# Patient Record
Sex: FEMALE
[De-identification: names, ages, dates, MRNs, and addresses within clinical notes are randomized per-mention and may not be internally consistent; named-entity substitution may affect disease eponyms.]

---

## 2019-01-11 ENCOUNTER — HOSPITAL ENCOUNTER (EMERGENCY)
Dept: HOSPITAL 31 - C.ER | Age: 59
Discharge: HOME | End: 2019-01-11
Payer: MEDICAID

## 2019-01-11 VITALS
DIASTOLIC BLOOD PRESSURE: 83 MMHG | HEART RATE: 91 BPM | TEMPERATURE: 98.2 F | OXYGEN SATURATION: 97 % | SYSTOLIC BLOOD PRESSURE: 148 MMHG | RESPIRATION RATE: 20 BRPM

## 2019-01-11 VITALS — BODY MASS INDEX: 30.9 KG/M2

## 2019-01-11 DIAGNOSIS — M25.561: Primary | ICD-10-CM

## 2019-01-11 NOTE — C.PDOC
History Of Present Illness


58 year old female presents to the ED for evaluation of right knee pain which 

began around 3 days ago, after working out at the gym. Patient reports she has 

been limping and has not taken any medicine for her pain. She denies direct 

trauma/injury to the area, falls, fever, chills, leg weakness or paresthesias. 


Time Seen by Provider: 01/11/19 17:16


Chief Complaint (Nursing): Lower Extremity Problem/Injury


History Per: Patient


History/Exam Limitations: no limitations


Onset/Duration Of Symptoms: Days (3)


Current Symptoms Are (Timing): Still Present


Additional History Per: Patient





- Knee


Description Of Injury: denies: Fell, Struck With Object, Struck Against Object, 

Twisted





Past Medical History


Reviewed: Historical Data, Nursing Documentation, Vital Signs


Vital Signs: 





                                Last Vital Signs











Temp  98.2 F   01/11/19 17:03


 


Pulse  91 H  01/11/19 17:03


 


Resp  20   01/11/19 17:03


 


BP  148/83   01/11/19 17:03


 


Pulse Ox  97   01/11/19 17:03














- Medical History


PMH: No Chronic Diseases


Surgical History: No Surg Hx


Family History: States: Unknown Family Hx





- Social History


Hx Tobacco Use: No


Hx Alcohol Use: No


Hx Substance Use: No





- Immunization History


Hx Tetanus Toxoid Vaccination: Yes


Hx Influenza Vaccination: Yes


Hx Pneumococcal Vaccination: Yes





Review Of Systems


Constitutional: Negative for: Fever, Chills


Musculoskeletal: Positive for: Other (right knee pain )


Neurological: Negative for: Weakness, Other (paresthesia)





Physical Exam





- Physical Exam


Appears: Non-toxic, No Acute Distress


Skin: Normal Color, Warm, Dry, No Ecchymosis, No Other (erythema to right knee )


Head: Atraumatic, Normacephalic


Eye(s): bilateral: Normal Inspection


Extremity: Normal ROM, Tenderness (point, to inferior aspect of the lateral 

right knee ), Capillary Refill (less than 2 seconds ), No Swelling, No Other 

(joint laxity)


Pulses: Left Dorsalis Pedis: Normal, Right Dorsalis Pedis: Normal


Neurological/Psych: Oriented x3, Normal Speech, Normal Cognition, Normal 

Sensation


Gait: Other (slight limping noted)





ED Course And Treatment


O2 Sat by Pulse Oximetry: 97 (on RA)


Pulse Ox Interpretation: Normal





- Other Rad


  ** right knee XR


X-Ray: Interpreted by Me (negative )





Medical Decision Making


Medical Decision Making: 





Progress: 


Right knee XR ordered. 








Disposition


Counseled Patient/Family Regarding: Studies Performed, Diagnosis, Need For 

Followup





- Disposition


Referrals: 


[facility] [Outside]


[facility] [Outside]


Disposition: HOME/ ROUTINE


Disposition Time: 18:42


Condition: STABLE


Additional Instructions: 





XIMENA BOOGIE, thank you for letting us take care of you today. Your provider 

was Mara Alfonso MD and you were treated for RT KNEE PAIN. The emergency med

[facility] care you received today was directed at your acute symptoms. If you were 

prescribed any medication, please fill it and take as directed. It may take 

several days for your symptoms to resolve. Return to the Emergency Department if

 your symptoms worsen, do not improve, or if you have any other problems.





Please contact your doctor or call one of the physicians/clinics you have been 

referred to that are listed on the Patient Visit Information form that is 

included in your discharge packet. Bring any paperwork you were given at 

discharge with you along with any medications you are taking to your follow up 

visit. Our treatment cannot replace ongoing medical care by a primary care 

provider outside of the emergency department.





Thank you for allowing the [facility] team to be part of your care today.








If you had an X-Ray or CT scan: A Radiologist will review the ED reading if any 

change in treatment is needed we will contact you.***





If you had a blood, urine, or wound culture: It will take several days for the 

results, if any change in treatment is needed we will contact you.***





If you had an STI test: It will take 48 hours for the results. Please call after

 1 week if you have not heard back.***


Prescriptions: 


Ibuprofen [Motrin Tab] 800 mg PO TID PRN #30 tab


 PRN Reason: Pain, Moderate (4-7)


Instructions:  Knee Pain (DC)


Forms:  Gen Discharge Inst Jamaican, [facility] (Jamaican)


Print Language: Tristanian





- POA


Present On Arrival: None





- Clinical Impression


Clinical Impression: 


 Knee pain, acute








- Scribe Statement


The provider has reviewed the documentation as recorded by the Scribe (Marquita Bailey)


Provider Attestation: 








All medical record entries made by the Scribe were at my direction and 

personally dictated by me. I have reviewed the chart and agree that the record 

accurately reflects my personal performance of the history, physical exam, 

medical decision making, and the department course for this patient. I have also

personally directed, reviewed, and agree with the discharge instructions and 

disposition.

## 2019-01-12 NOTE — RAD
Date of service: 



01/11/2019



PROCEDURE:  Right Knee Radiographs.



HISTORY:

Pain 



COMPARISON:

None.



FINDINGS:



BONES:

Bone alignment and mineralization are normal.  There is no acute 

displaced fracture or bone destruction.



JOINTS:

There is mild tricompartmental degenerative osteoarthrosis with 

reduced joint spaces, marginal osteophytes and tibial spiking, worse 

in the medial compartment. 



JOINT EFFUSION:

There is a small suprapatellar joint effusion. 



OTHER FINDINGS:

None.



IMPRESSION:

No acute fracture or dislocation.



Mild tricompartmental degenerative osteoarthrosis, worse in the 

medial compartment.